# Patient Record
Sex: FEMALE | URBAN - METROPOLITAN AREA
[De-identification: names, ages, dates, MRNs, and addresses within clinical notes are randomized per-mention and may not be internally consistent; named-entity substitution may affect disease eponyms.]

---

## 2023-10-18 ENCOUNTER — IMMUNIZATION (OUTPATIENT)
Age: 43
End: 2023-10-18

## 2023-10-18 DIAGNOSIS — Z11.1 TUBERCULOSIS SCREENING: Primary | ICD-10-CM

## 2023-10-18 NOTE — PATIENT INSTRUCTIONS
Post TB skin care instructions given, patient verbalized understanding. Instructed to RTC in 48 hours. Keke Dietrich

## 2023-10-18 NOTE — PROGRESS NOTES
PPD Placement note  Liliana Lund, 37 y.o. female is here today for placement of PPD test  Reason for PPD test: employment  Pt taken PPD test before: yes  Verified in allergy area and with patient that they are not allergic to the products PPD is made of (Phenol or Tween). Yes  Is patient taking any oral or IV steroid medication now or have they taken it in the last month? no  Has the patient ever received the BCG vaccine?: no  Has the patient been in recent contact with anyone known or suspected of having active TB disease?: no       Date of exposure (if applicable): n/a       Name of person they were exposed to (if applicable): n/a  Patient's Country of origin?: n/a  O: Alert and oriented in NAD. P:  PPD placed on 10/18/2023. Patient advised to return for reading within 48-72 hours.

## 2023-10-20 ENCOUNTER — IMMUNIZATION (OUTPATIENT)
Age: 43
End: 2023-10-20

## 2023-10-20 NOTE — PROGRESS NOTES
PPD Reading Note  PPD read and results entered in [facility]. Result: 0 mm induration.   Interpretation: negative  If test not read within 48-72 hours of initial placement, patient advised to repeat in other arm 1-3 weeks after this test.  Allergic reaction: no  Jose Quevedo RN